# Patient Record
Sex: MALE | Race: WHITE | Employment: FULL TIME | URBAN - METROPOLITAN AREA
[De-identification: names, ages, dates, MRNs, and addresses within clinical notes are randomized per-mention and may not be internally consistent; named-entity substitution may affect disease eponyms.]

---

## 2023-07-23 ENCOUNTER — HOSPITAL ENCOUNTER (OUTPATIENT)
Facility: HOSPITAL | Age: 63
Setting detail: OBSERVATION
LOS: 1 days | Discharge: HOME | End: 2023-07-28
Attending: PSYCHIATRY & NEUROLOGY | Admitting: PSYCHIATRY & NEUROLOGY
Payer: OTHER GOVERNMENT

## 2023-07-23 PROCEDURE — G0378 HOSPITAL OBSERVATION PER HR: HCPCS

## 2023-07-23 PROCEDURE — 6370000000 HC RX 637 (ALT 250 FOR IP): Performed by: PSYCHIATRY & NEUROLOGY

## 2023-07-23 RX ORDER — ACETAMINOPHEN 325 MG/1
650 TABLET ORAL
Status: COMPLETED | OUTPATIENT
Start: 2023-07-23 | End: 2023-07-25

## 2023-07-23 RX ORDER — TRAZODONE HYDROCHLORIDE 50 MG/1
50 TABLET ORAL NIGHTLY
Status: DISCONTINUED | OUTPATIENT
Start: 2023-07-23 | End: 2023-07-23

## 2023-07-23 RX ORDER — MAGNESIUM HYDROXIDE/ALUMINUM HYDROXICE/SIMETHICONE 120; 1200; 1200 MG/30ML; MG/30ML; MG/30ML
30 SUSPENSION ORAL EVERY 6 HOURS PRN
Status: DISCONTINUED | OUTPATIENT
Start: 2023-07-23 | End: 2023-07-28 | Stop reason: HOSPADM

## 2023-07-23 RX ORDER — ALOGLIPTIN 12.5 MG/1
12.5 TABLET, FILM COATED ORAL DAILY
Status: DISCONTINUED | OUTPATIENT
Start: 2023-07-24 | End: 2023-07-28 | Stop reason: HOSPADM

## 2023-07-23 RX ORDER — TRAZODONE HYDROCHLORIDE 50 MG/1
100 TABLET ORAL NIGHTLY
Status: DISCONTINUED | OUTPATIENT
Start: 2023-07-24 | End: 2023-07-25

## 2023-07-23 RX ORDER — CHOLECALCIFEROL (VITAMIN D3) 125 MCG
500 CAPSULE ORAL DAILY
Status: DISCONTINUED | OUTPATIENT
Start: 2023-07-24 | End: 2023-07-28 | Stop reason: HOSPADM

## 2023-07-23 RX ORDER — DIVALPROEX SODIUM 500 MG/1
500 TABLET, DELAYED RELEASE ORAL 3 TIMES DAILY
Status: DISCONTINUED | OUTPATIENT
Start: 2023-07-23 | End: 2023-07-27

## 2023-07-23 RX ORDER — ATORVASTATIN CALCIUM 40 MG/1
40 TABLET, FILM COATED ORAL NIGHTLY
Status: DISCONTINUED | OUTPATIENT
Start: 2023-07-23 | End: 2023-07-28 | Stop reason: HOSPADM

## 2023-07-23 RX ORDER — GAUZE BANDAGE 2" X 2"
100 BANDAGE TOPICAL DAILY
Status: DISCONTINUED | OUTPATIENT
Start: 2023-07-24 | End: 2023-07-28 | Stop reason: HOSPADM

## 2023-07-23 RX ORDER — CHLORDIAZEPOXIDE HYDROCHLORIDE 25 MG/1
25 CAPSULE, GELATIN COATED ORAL 3 TIMES DAILY
Status: DISCONTINUED | OUTPATIENT
Start: 2023-07-23 | End: 2023-07-27

## 2023-07-23 RX ORDER — HYDROCHLOROTHIAZIDE 25 MG/1
12.5 TABLET ORAL DAILY
Status: DISCONTINUED | OUTPATIENT
Start: 2023-07-24 | End: 2023-07-28 | Stop reason: HOSPADM

## 2023-07-23 RX ORDER — AMLODIPINE BESYLATE 5 MG/1
10 TABLET ORAL DAILY
Status: DISCONTINUED | OUTPATIENT
Start: 2023-07-24 | End: 2023-07-28 | Stop reason: HOSPADM

## 2023-07-23 RX ORDER — ZIPRASIDONE HYDROCHLORIDE 40 MG/1
40 CAPSULE ORAL DAILY
Status: DISCONTINUED | OUTPATIENT
Start: 2023-07-24 | End: 2023-07-26

## 2023-07-23 RX ORDER — HYDROXYZINE PAMOATE 25 MG/1
25 CAPSULE ORAL 3 TIMES DAILY PRN
Status: DISCONTINUED | OUTPATIENT
Start: 2023-07-23 | End: 2023-07-28 | Stop reason: HOSPADM

## 2023-07-23 ASSESSMENT — SLEEP AND FATIGUE QUESTIONNAIRES
DO YOU HAVE DIFFICULTY SLEEPING: NO
DO YOU USE A SLEEP AID: NO
AVERAGE NUMBER OF SLEEP HOURS: 6

## 2023-07-23 ASSESSMENT — LIFESTYLE VARIABLES
HOW OFTEN DO YOU HAVE A DRINK CONTAINING ALCOHOL: NEVER
HOW MANY STANDARD DRINKS CONTAINING ALCOHOL DO YOU HAVE ON A TYPICAL DAY: PATIENT DOES NOT DRINK

## 2023-07-23 NOTE — GROUP NOTE
Group Therapy Note    Date: 7/23/2023    Group Start Time: 2493  Group End Time: 2919  Group Topic: Recreational    SSR 2 [address] [facility]        Group Therapy Note    Attendees: 10/11    Recreational Therapist facilitated structured leisure skills group to introduce healthy leisure skills as positive way to cope and manage mood. Patient's Goal:  Attend groups daily    Notes: Attended group and listened to songs with peers. Was receptive to intervention and responded to prompts from staff. Attended entire session and was attentive during group. Status After Intervention:  Improved    Participation Level: Active Listener    Participation Quality: Appropriate      Speech:  normal      Thought Process/Content: Logical      Affective Functioning: Congruent      Mood:  calm       Level of consciousness:  Alert      Response to Learning: Progressing to goal      Endings: None Reported    Modes of Intervention: Activity      Discipline Responsible: Recreational Therapist      Signature:   Kendy Montoya
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
## 2023-07-24 LAB
GLUCOSE BLD STRIP.AUTO-MCNC: 121 MG/DL (ref 65–100)
GLUCOSE BLD STRIP.AUTO-MCNC: 157 MG/DL (ref 65–100)
PERFORMED BY:: ABNORMAL
PERFORMED BY:: ABNORMAL
VALPROATE SERPL-MCNC: 48 UG/ML (ref 50–100)
VALPROATE SERPL-MCNC: 52 UG/ML (ref 50–100)

## 2023-07-24 PROCEDURE — 36415 COLL VENOUS BLD VENIPUNCTURE: CPT

## 2023-07-24 PROCEDURE — 6370000000 HC RX 637 (ALT 250 FOR IP): Performed by: PSYCHIATRY & NEUROLOGY

## 2023-07-24 PROCEDURE — 80164 ASSAY DIPROPYLACETIC ACD TOT: CPT

## 2023-07-24 PROCEDURE — 82962 GLUCOSE BLOOD TEST: CPT

## 2023-07-24 PROCEDURE — G0378 HOSPITAL OBSERVATION PER HR: HCPCS

## 2023-07-24 RX ORDER — ALBUTEROL SULFATE 90 UG/1
2 AEROSOL, METERED RESPIRATORY (INHALATION) EVERY 6 HOURS PRN
Status: DISCONTINUED | OUTPATIENT
Start: 2023-07-24 | End: 2023-07-28 | Stop reason: HOSPADM

## 2023-07-24 RX ORDER — DEXTROSE MONOHYDRATE 100 MG/ML
INJECTION, SOLUTION INTRAVENOUS CONTINUOUS PRN
Status: DISCONTINUED | OUTPATIENT
Start: 2023-07-24 | End: 2023-07-28 | Stop reason: HOSPADM

## 2023-07-24 RX ORDER — INSULIN LISPRO 100 [IU]/ML
0-4 INJECTION, SOLUTION INTRAVENOUS; SUBCUTANEOUS NIGHTLY
Status: DISCONTINUED | OUTPATIENT
Start: 2023-07-24 | End: 2023-07-28 | Stop reason: HOSPADM

## 2023-07-24 RX ORDER — INSULIN LISPRO 100 [IU]/ML
0-4 INJECTION, SOLUTION INTRAVENOUS; SUBCUTANEOUS
Status: DISCONTINUED | OUTPATIENT
Start: 2023-07-24 | End: 2023-07-28 | Stop reason: HOSPADM

## 2023-07-24 RX ADMIN — AMLODIPINE BESYLATE 10 MG: 5 TABLET ORAL at 08:52

## 2023-07-24 RX ADMIN — DIVALPROEX SODIUM 500 MG: 500 TABLET, DELAYED RELEASE ORAL at 17:30

## 2023-07-24 RX ADMIN — HYDROCHLOROTHIAZIDE 12.5 MG: 25 TABLET ORAL at 08:53

## 2023-07-24 RX ADMIN — ATORVASTATIN CALCIUM 40 MG: 40 TABLET, FILM COATED ORAL at 21:13

## 2023-07-24 RX ADMIN — ALOGLIPTIN 12.5 MG: 12.5 TABLET, FILM COATED ORAL at 08:52

## 2023-07-24 RX ADMIN — DIVALPROEX SODIUM 500 MG: 500 TABLET, DELAYED RELEASE ORAL at 21:14

## 2023-07-24 RX ADMIN — DIVALPROEX SODIUM 500 MG: 500 TABLET, DELAYED RELEASE ORAL at 08:52

## 2023-07-24 RX ADMIN — TRAZODONE HYDROCHLORIDE 100 MG: 50 TABLET ORAL at 21:14

## 2023-07-24 RX ADMIN — CHLORDIAZEPOXIDE HYDROCHLORIDE 25 MG: 25 CAPSULE ORAL at 21:13

## 2023-07-24 RX ADMIN — CYANOCOBALAMIN TAB 500 MCG 500 MCG: 500 TAB at 08:53

## 2023-07-24 RX ADMIN — Medication 100 MG: at 08:53

## 2023-07-24 NOTE — BH NOTE
Affect full. Much grandiose talk. Kenzie Gonzalez \"I am a very smart, intelligent person. \" Pt said he is a , and did not return to the [place], \"because I don't like the way, I was treated. \" Verbalized many complaints about this hospital; as well;  said he would change \"a lot of things. \"  Consumed approx 1/2 of his bfkt; complained about what he was served and gave much of it away, to peers. Pt is here, under a TDO, and is scheduled to have his commitment hearing today. Refused 0900 scheduled Geodon and Librium; stated, \"I don't take those meds. \" Compliant with remaining meds. Showered anc changed his clothes. Q 15 mins checks maintained, for safety. Committed x 10 days. Remained calm, after returning from hearing. Pt requested to speak with KRYSTAL Victor RN/CC, this morning; however, when he came to talk to him, pt stated, \"I just want you to know I am Ievlisse's protector. \" XIMENA is another pt, on the unit.

## 2023-07-24 NOTE — PLAN OF CARE
Problem: Discharge Planning  Goal: Discharge to home or other facility with appropriate resources  Outcome: Progressing     Problem: Safety - Adult  Goal: Free from fall injury  7/24/2023 0249 by Carolina Pereira RN  Outcome: Progressing  7/23/2023 1809 by Dev De Jesus RN  Outcome: Progressing     Problem: Confusion  Goal: Confusion, delirium, dementia, or psychosis is improved or at baseline  Description: INTERVENTIONS:  1. Assess for possible contributors to thought disturbance, including medications, impaired vision or hearing, underlying metabolic abnormalities, dehydration, psychiatric diagnoses, and notify attending LIP  2. [place] high risk fall precautions, as indicated  3. Provide frequent short contacts to provide reality reorientation, refocusing and direction  4. Decrease environmental stimuli, including noise as appropriate  5. Monitor and intervene to maintain adequate nutrition, hydration, elimination, sleep and activity  6. If unable to ensure safety without constant attention obtain sitter and review sitter guidelines with assigned personnel  7.  Initiate Psychosocial CNS and Spiritual Care consult, as indicated  7/24/2023 0249 by Carolina Pereira RN  Outcome: Progressing  7/23/2023 1812 by Dev De Jesus RN  Outcome: Progressing

## 2023-07-24 NOTE — CARE COORDINATION
07/24/23 1322   ITP   Date of Plan 07/24/23   Date of Next Review 08/04/23   Barriers to Treatment Client resistance   Strengths Incorporated in Plan Acknowledging need for assistance   Plan of Care   Long Term Goal (LTG) Stated in patient/guardian terms \"to get released from the hospital\"   Short Term Goal 1   Short Term Goal 1 Client will be oriented to program and staff, and participate in assessment process   Baseline Functioning to make patient comfortable with environment while in the hospital   Target the patient will be able to approach staff and voice appropriate needs   Intervention 1 Acknowledge client strengths   Frequency daily   Measured by Self report;Staff observation   Staff Responsible Clinical staff;[facility] staff   Intervention 2 Assess safety   Frequency daily   Measured by Self report;Staff observation   Staff Responsible Clinical staff;[facility] staff   Intervention 3 Group therapy   Frequency daily   Measured by Self report;Staff observation   Staff Responsible Clinical staff;[facility] staff   STG Goal 1 Status: Patient Appears to be  Progressing toward treatment plan goal   Short Term Goal 2   Short Term Goal 2 Client will learn and demonstrate effective coping skills   Baseline Functioning to improve overall quality of life   Target patient will be able to use positive skills to deal with life stressors   Objectives Client will participate in individual therapy;Client will participate in group therapy   Intervention 1 Milieu therapy and support   Frequency daily   Measured by Self report;Staff observation   Staff Responsible Clinical staff;[facility] staff   Intervention 2 Monitor medications   Frequency daily   Measured by Self report;Staff observation   Staff Responsible Clinical staff;[facility] staff   Intervention 3 Group therapy   Frequency daily   Measured by Self report;Staff observation   Staff Responsible Clinical staff;[facility] staff   STG Goal 2 Status: Patient Appears to be  Progressing toward treatment plan

## 2023-07-24 NOTE — BH NOTE
80 Female peer was redirected, for standing very close, to pt, touching his face and chest. Pt did not move. Both patients were informed, physical contact, is not allowed. Pt took on the protective role, of female peer. when staff went in to give female peer, her meds, for agitation, pt asked female peer, Suraj Blinks can I do for you, so that you don't have to take medicines. \" Pt was asked to go to his room, by KRYSTAL Jewell RN, and security. Pt cooperative with going to his room.

## 2023-07-24 NOTE — GROUP NOTE
Group Therapy Note    Date: 7/24/2023    Group Start Time: 1525  Group End Time: 5371  Group Topic: Recreational    SSR 2 BH NON ACUTE    Beatriz Neil        Group Therapy Note    Facilitated leisure skills group to reinforce positive coping and to manage mood through music, social interaction, group activities and art task     Attendees: 7/11       Patient's Goal:  Client will learn and demonstrate effective coping skills    Notes:  Pt was receptive to listening to music and songs he selected. Interacted with peer and staff when prompted. Declined to work on leisure task    Status After Intervention: Improved    Participation Level:  Active Listener and Interactive    Participation Quality: Appropriate      Speech:  normal      Thought Process/Content: Logical      Affective Functioning: Flat      Mood:  Calm      Level of consciousness:  Alert      Response to Learning: Progressing to goal      Endings: None Reported    Modes of Intervention: Socialization and Activity      Discipline Responsible: Recreational Therapist      Signature:  Barb Alvarado

## 2023-07-24 NOTE — BH NOTE
HEARING DISPOSITION     : Jarrod Stephen  : Ms. Debo Susana: 10 Days   PRASAD: Any anti-psychotic and/or anti-anxiety   Expires: August 2,2023
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
## 2023-07-24 NOTE — BH NOTE
62yo TDO admitted 7/23/23 d/t psychosis & HI. Hx Bipolar, PTSD, cocaine, MJ, ETOH, HTN, DM, COPD. Patient presents poor historian, poor judgment, poor insight. Patient denies SI/HI, depression/anxiety, hallucinations/delusions, pain/withdrawal symptoms. CIWA = 2. Tremors noted, patient states it's from medication specifically geodon which was discontinued last week however according to chart prozac discontinued & latuda started last week, medication compliance questionable. Patient unsure/unclear of active medications & dosages, medication history obtained from prescreen & other hospital records including norvasc, alogliptin, lipitor, depakote, hydrochlorothiazide, geodon. Patient also requesting fish oil, vitD3, B12, ativan. Patient c/o hypomania, bible provided per request & patient in room to decrease stimuli d/t \"nuttiness\" on the unit. Continue close observation checks, detox protocol including CIWA assessments, seizure precautions, fall precautions. H&Ps pending, VPA level pending. Addendum:     7222 Patient awake, walking the halls, stating he'll be up all night d/t sleeping all day. Still refusing any medications at this time, stating he's okay. 1027 Patient awake intermittently throughout the night, reading bible, walking around, exercising, looking out of hallway window.

## 2023-07-24 NOTE — BH NOTE
Refused 0900 scheduled Geodon & Librium; stated, \"I don't take those meds. I wasn't given those @ the [facility]. \"

## 2023-07-25 LAB
GLUCOSE BLD STRIP.AUTO-MCNC: 109 MG/DL (ref 65–100)
GLUCOSE BLD STRIP.AUTO-MCNC: 144 MG/DL (ref 65–100)
GLUCOSE BLD STRIP.AUTO-MCNC: 188 MG/DL (ref 65–100)
GLUCOSE BLD STRIP.AUTO-MCNC: 197 MG/DL (ref 65–100)
PERFORMED BY:: ABNORMAL

## 2023-07-25 PROCEDURE — G0378 HOSPITAL OBSERVATION PER HR: HCPCS

## 2023-07-25 PROCEDURE — 82962 GLUCOSE BLOOD TEST: CPT

## 2023-07-25 PROCEDURE — 6370000000 HC RX 637 (ALT 250 FOR IP): Performed by: PSYCHIATRY & NEUROLOGY

## 2023-07-25 RX ORDER — TRAZODONE HYDROCHLORIDE 50 MG/1
50 TABLET ORAL NIGHTLY
Status: DISCONTINUED | OUTPATIENT
Start: 2023-07-25 | End: 2023-07-28 | Stop reason: HOSPADM

## 2023-07-25 RX ADMIN — DIVALPROEX SODIUM 500 MG: 500 TABLET, DELAYED RELEASE ORAL at 21:17

## 2023-07-25 RX ADMIN — ZIPRASIDONE HYDROCHLORIDE 40 MG: 40 CAPSULE ORAL at 08:35

## 2023-07-25 RX ADMIN — CHLORDIAZEPOXIDE HYDROCHLORIDE 25 MG: 25 CAPSULE ORAL at 08:36

## 2023-07-25 RX ADMIN — Medication 100 MG: at 08:37

## 2023-07-25 RX ADMIN — CHLORDIAZEPOXIDE HYDROCHLORIDE 25 MG: 25 CAPSULE ORAL at 21:20

## 2023-07-25 RX ADMIN — CHLORDIAZEPOXIDE HYDROCHLORIDE 25 MG: 25 CAPSULE ORAL at 14:22

## 2023-07-25 RX ADMIN — TRAZODONE HYDROCHLORIDE 50 MG: 50 TABLET ORAL at 21:18

## 2023-07-25 RX ADMIN — DIVALPROEX SODIUM 500 MG: 500 TABLET, DELAYED RELEASE ORAL at 08:37

## 2023-07-25 RX ADMIN — AMLODIPINE BESYLATE 10 MG: 5 TABLET ORAL at 08:35

## 2023-07-25 RX ADMIN — ACETAMINOPHEN 650 MG: 325 TABLET ORAL at 14:19

## 2023-07-25 RX ADMIN — CYANOCOBALAMIN TAB 500 MCG 500 MCG: 500 TAB at 08:35

## 2023-07-25 RX ADMIN — ATORVASTATIN CALCIUM 40 MG: 40 TABLET, FILM COATED ORAL at 21:17

## 2023-07-25 RX ADMIN — ALOGLIPTIN 12.5 MG: 12.5 TABLET, FILM COATED ORAL at 08:36

## 2023-07-25 RX ADMIN — DIVALPROEX SODIUM 500 MG: 500 TABLET, DELAYED RELEASE ORAL at 14:00

## 2023-07-25 RX ADMIN — HYDROCHLOROTHIAZIDE 12.5 MG: 25 TABLET ORAL at 08:36

## 2023-07-25 ASSESSMENT — PAIN SCALES - GENERAL: PAINLEVEL_OUTOF10: 4

## 2023-07-25 ASSESSMENT — PAIN DESCRIPTION - LOCATION: LOCATION: FOOT

## 2023-07-25 ASSESSMENT — PAIN DESCRIPTION - DESCRIPTORS: DESCRIPTORS: ACHING;BURNING

## 2023-07-25 ASSESSMENT — PAIN DESCRIPTION - ORIENTATION: ORIENTATION: RIGHT;LEFT

## 2023-07-25 ASSESSMENT — PAIN - FUNCTIONAL ASSESSMENT: PAIN_FUNCTIONAL_ASSESSMENT: ACTIVITIES ARE NOT PREVENTED

## 2023-07-25 NOTE — BH NOTE
Client is pleasant and cooperative. Alert and oriented x 3. He has a good appetite and reports sleeping well. Denies any thoughts to harm himself or others. Client called his wife and gave her his personal id number. He has been attending scheduled groups and unit activities. He is close with select female peer. No inappropiate actions on their part. Denies any withdrawal symptoms. Remains on close observation for safety.

## 2023-07-25 NOTE — PLAN OF CARE
Problem: Discharge Planning  Goal: Discharge to home or other facility with appropriate resources  Outcome: Progressing     Problem: Safety - Adult  Goal: Free from fall injury  Outcome: Progressing     Problem: Confusion  Goal: Confusion, delirium, dementia, or psychosis is improved or at baseline  Description: INTERVENTIONS:  1. Assess for possible contributors to thought disturbance, including medications, impaired vision or hearing, underlying metabolic abnormalities, dehydration, psychiatric diagnoses, and notify attending LIP  2. [place] high risk fall precautions, as indicated  3. Provide frequent short contacts to provide reality reorientation, refocusing and direction  4. Decrease environmental stimuli, including noise as appropriate  5. Monitor and intervene to maintain adequate nutrition, hydration, elimination, sleep and activity  6. If unable to ensure safety without constant attention obtain sitter and review sitter guidelines with assigned personnel  7.  Initiate Psychosocial CNS and Spiritual Care consult, as indicated  Outcome: Progressing

## 2023-07-25 NOTE — GROUP NOTE
Group Therapy Note    Date: 7/25/2023    Group Start Time: 6875  Group End Time: 1600  Group Topic: Education Group - Inpatient    SSR 2 BH NON ACUTE    Christa Hoskins        Group Therapy Note    Facilitated group focused on introducing information on learning to complete a thought log to help challenge negative thoughts and develop a more accurate view of a situation to improve mood and behavior     Attendees: 6/11       Notes: Encouraged but did not attend    Discipline Responsible: Recreational Therapist      Signature:  SOLO Regan
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
## 2023-07-25 NOTE — PROGRESS NOTES
Patient, Mr. Marli López, had asked for spiritual  and encouragement. He asked for and choose to be a part of a spirituality group meeting. He willingly participated and engaged in some conversation. He expressed having little patience with others, especially rude people or people of difference of opinion, which became evident during our time together. He desires more, and admits to the wisdom of allowing the helping hands of others and accountability. He expressed much appreciation and gratitude for the care, support and the talk. Chaplain Britt Mcpherson M.Div., [facility]. Please contact Hospital  for [address] services.    [phone]
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
## 2023-07-25 NOTE — BH NOTE
. /81. Patient denies SI/HI; denies withdrawal symptoms; CIWA = 2, tremors noted; presents social, grandiose, labile. Patient noted in dayroom interacting with peers; compliant with bedtime snack & medications including librium after initially refusing. Hearing today resulted in commitment with order to treat until august 2 2023. Continue safety precautions including q15m checks, detox protocol, seizure precautions, fall precautions; continue medication management, group therapy.

## 2023-07-25 NOTE — GROUP NOTE
Group Therapy Note    Date: 7/25/2023    Group Start Time: 1600  Group End Time: 1640  Group Topic: Recreational    SSR 2 BH NON ACUTE    Michael Buchanan        Group Therapy Note    Facilitated leisure skills group to reinforce positive coping and to manage mood through music, social interaction, group activities and art task    Attendees: 7/11       Patient's Goal:  Client will learn and demonstrate effective coping skills     Notes:  Pt was receptive to listening to music for a few minutes then he fell asleep. Before leaving group pt stated \"he was sorry for falling asleep\"    Status After Intervention:  Improved    Participation Level:  Active Listener    Participation Quality: Appropriate      Speech:  normal      Thought Process/Content: Logical      Affective Functioning: Congruent      Mood:  Calm      Level of consciousness:  Sleepy      Response to Learning: Progressing to goal      Endings: None Reported    Modes of Intervention: Socialization and Activity      Discipline Responsible: Recreational Therapist      Signature:  Barb Gilbert

## 2023-07-26 LAB
GLUCOSE BLD STRIP.AUTO-MCNC: 111 MG/DL (ref 65–100)
GLUCOSE BLD STRIP.AUTO-MCNC: 131 MG/DL (ref 65–100)
GLUCOSE BLD STRIP.AUTO-MCNC: 157 MG/DL (ref 65–100)
PERFORMED BY:: ABNORMAL

## 2023-07-26 PROCEDURE — 82962 GLUCOSE BLOOD TEST: CPT

## 2023-07-26 PROCEDURE — 6370000000 HC RX 637 (ALT 250 FOR IP): Performed by: PSYCHIATRY & NEUROLOGY

## 2023-07-26 PROCEDURE — G0378 HOSPITAL OBSERVATION PER HR: HCPCS

## 2023-07-26 RX ORDER — ACETAMINOPHEN 325 MG/1
650 TABLET ORAL EVERY 4 HOURS PRN
Status: DISCONTINUED | OUTPATIENT
Start: 2023-07-26 | End: 2023-07-28 | Stop reason: HOSPADM

## 2023-07-26 RX ORDER — LURASIDONE HYDROCHLORIDE 40 MG/1
40 TABLET, FILM COATED ORAL
Status: DISCONTINUED | OUTPATIENT
Start: 2023-07-26 | End: 2023-07-27

## 2023-07-26 RX ADMIN — DIVALPROEX SODIUM 500 MG: 500 TABLET, DELAYED RELEASE ORAL at 14:41

## 2023-07-26 RX ADMIN — DIVALPROEX SODIUM 500 MG: 500 TABLET, DELAYED RELEASE ORAL at 21:06

## 2023-07-26 RX ADMIN — CHLORDIAZEPOXIDE HYDROCHLORIDE 25 MG: 25 CAPSULE ORAL at 21:06

## 2023-07-26 RX ADMIN — DIVALPROEX SODIUM 500 MG: 500 TABLET, DELAYED RELEASE ORAL at 08:22

## 2023-07-26 RX ADMIN — TRAZODONE HYDROCHLORIDE 50 MG: 50 TABLET ORAL at 21:06

## 2023-07-26 RX ADMIN — ATORVASTATIN CALCIUM 40 MG: 40 TABLET, FILM COATED ORAL at 21:06

## 2023-07-26 RX ADMIN — LURASIDONE HYDROCHLORIDE 40 MG: 40 TABLET, FILM COATED ORAL at 14:41

## 2023-07-26 RX ADMIN — AMLODIPINE BESYLATE 10 MG: 5 TABLET ORAL at 08:23

## 2023-07-26 RX ADMIN — CYANOCOBALAMIN TAB 500 MCG 500 MCG: 500 TAB at 08:23

## 2023-07-26 RX ADMIN — CHLORDIAZEPOXIDE HYDROCHLORIDE 25 MG: 25 CAPSULE ORAL at 14:41

## 2023-07-26 RX ADMIN — HYDROCHLOROTHIAZIDE 12.5 MG: 25 TABLET ORAL at 08:22

## 2023-07-26 RX ADMIN — Medication 100 MG: at 08:22

## 2023-07-26 RX ADMIN — CHLORDIAZEPOXIDE HYDROCHLORIDE 25 MG: 25 CAPSULE ORAL at 08:22

## 2023-07-26 RX ADMIN — ALOGLIPTIN 12.5 MG: 12.5 TABLET, FILM COATED ORAL at 08:22

## 2023-07-26 RX ADMIN — ACETAMINOPHEN 650 MG: 325 TABLET ORAL at 03:25

## 2023-07-26 ASSESSMENT — PAIN DESCRIPTION - ORIENTATION: ORIENTATION: LEFT

## 2023-07-26 ASSESSMENT — PAIN DESCRIPTION - LOCATION: LOCATION: FINGER (COMMENT WHICH ONE)

## 2023-07-26 ASSESSMENT — PAIN DESCRIPTION - DESCRIPTORS: DESCRIPTORS: ACHING

## 2023-07-26 ASSESSMENT — PAIN - FUNCTIONAL ASSESSMENT: PAIN_FUNCTIONAL_ASSESSMENT: ACTIVITIES ARE NOT PREVENTED

## 2023-07-26 ASSESSMENT — PAIN SCALES - GENERAL: PAINLEVEL_OUTOF10: 8
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
## 2023-07-26 NOTE — BH NOTE
This writer received vm from ms rosalia/wife stating that the pt called her stating that he is feeling unsafe and threatened by peers. This writer called wife and left vm that pt and his peers are being closely being monitored by staff and will cont to be monitored to maintain a safe environment. [facility] staff, ms escamilla and david aware of this conversation.
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
## 2023-07-26 NOTE — PLAN OF CARE
Problem: Safety - Adult  Goal: Free from fall injury  Outcome: Progressing     Problem: Confusion  Goal: Confusion, delirium, dementia, or psychosis is improved or at baseline  Description: INTERVENTIONS:  1. Assess for possible contributors to thought disturbance, including medications, impaired vision or hearing, underlying metabolic abnormalities, dehydration, psychiatric diagnoses, and notify attending LIP  2. [place] high risk fall precautions, as indicated  3. Provide frequent short contacts to provide reality reorientation, refocusing and direction  4. Decrease environmental stimuli, including noise as appropriate  5. Monitor and intervene to maintain adequate nutrition, hydration, elimination, sleep and activity  6. If unable to ensure safety without constant attention obtain sitter and review sitter guidelines with assigned personnel  7.  Initiate Psychosocial CNS and Spiritual Care consult, as indicated  Outcome: Progressing

## 2023-07-26 NOTE — BH NOTE
Behavioral Health Treatment Team Note     Patient goal(s) for today: \"to live [place] like as much as I can\"  Treatment team focus/goals: meds management, group therapy, dc planning    Progress note: pt was seen earlier in the morning while walking the hallway. Pt presented as alert, oriented, calm, cooperative. Pt denied current si/hi/ah/vh but remains religiously preoccupied. Pt stated \"I did not have a great night last night, there is just too much noise around here with all the codes being called during the night and also the people here are using foul language, yelling, lying and not being [place] like and that just makes me angry and pisses me off\" after making the above statement pt walked away. During the treatment team pt presented to be calm and polite initially, reporting to the attending provider that he was \"feeling fantastic\" and again focusing on how the other patients are \"not [place] like\" and that this was upsetting him. He also stated that his sister who is an  in [address] [place] [address] Street will be looking into his stay in the hospital. The longer the conversation went on pt became rude, sarcastic and short with the attending provider and demanded that he be give a private room \"by the end of the day\", the attending provider calmly stated that she would need to look into the matter, to which the pt replied \"f  off\" pt was seen pacing the hallway, cursing after this interaction. [facility] staff made aware of change in pts clinical presentation.     LOS:  1  Expected LOS: 5-7    Insurance info/prescription coverage:  VAC[employer] OPTUM  Date of last family contact:  Evi/wife [phone] on 7/26  Family requesting physician contact today:  Yes  Discharge plan:  to stabilize pt  Guns in the home:  No   Outpatient provider(s):   [facility] Flavio Wright [phone]    Participating treatment team members: Maxene Lanes, * (assigned SW), Ivanna Durham MS

## 2023-07-26 NOTE — PROGRESS NOTES
Spirituality Group   Patient actively participated in Spirituality Group in -    Group exercise was to discuss and/or identify, if possible, our individual worldview that is based upon our individual views on life's 4 most fundamental questions of: Where did I come from,  What is my worth/value,  What is my purpose,  What happens to me after I die? After discussion, Patient was asked to use the few days here to reflect and meditate on their answers to these questions and determine if all four answers are correspondingly true. Do my answers put together have a coherency and unity/harmony to them? And does my answers provide me with meaning, a morality, and a positive herson that leaves me with an inner peace. The purpose of this exercise is to find their beliefs and true identity, and thereby, connecting/creating an inner peace to help them navigate their lives through difficult times in healthy ways. Chaplain Nicholas Victoria M.Div., [facility]. Please contact Hospital  for [address] services.    [phone]

## 2023-07-26 NOTE — BH NOTE
Pt instigating others in dayroom and making racial comments to others. Pt told by staff that those comments are inappropriate and will need to leave the dayroom and go to his room if the comments do not stop and pt told writer \"make me\".

## 2023-07-27 LAB
GLUCOSE BLD STRIP.AUTO-MCNC: 133 MG/DL (ref 65–100)
GLUCOSE BLD STRIP.AUTO-MCNC: 184 MG/DL (ref 65–100)
GLUCOSE BLD STRIP.AUTO-MCNC: 90 MG/DL (ref 65–100)
PERFORMED BY:: ABNORMAL
PERFORMED BY:: ABNORMAL
PERFORMED BY:: NORMAL

## 2023-07-27 PROCEDURE — 6370000000 HC RX 637 (ALT 250 FOR IP): Performed by: PSYCHIATRY & NEUROLOGY

## 2023-07-27 PROCEDURE — 82962 GLUCOSE BLOOD TEST: CPT

## 2023-07-27 PROCEDURE — G0378 HOSPITAL OBSERVATION PER HR: HCPCS

## 2023-07-27 RX ORDER — DIVALPROEX SODIUM 500 MG/1
1000 TABLET, DELAYED RELEASE ORAL 2 TIMES DAILY
Status: DISCONTINUED | OUTPATIENT
Start: 2023-07-27 | End: 2023-07-28 | Stop reason: HOSPADM

## 2023-07-27 RX ORDER — LORAZEPAM 1 MG/1
2 TABLET ORAL EVERY 6 HOURS PRN
Status: DISCONTINUED | OUTPATIENT
Start: 2023-07-27 | End: 2023-07-28 | Stop reason: HOSPADM

## 2023-07-27 RX ORDER — LORAZEPAM 2 MG/ML
2 INJECTION INTRAMUSCULAR EVERY 4 HOURS PRN
Status: DISCONTINUED | OUTPATIENT
Start: 2023-07-27 | End: 2023-07-28 | Stop reason: HOSPADM

## 2023-07-27 RX ORDER — LORAZEPAM 1 MG/1
2 TABLET ORAL EVERY 4 HOURS PRN
Status: DISCONTINUED | OUTPATIENT
Start: 2023-07-27 | End: 2023-07-27

## 2023-07-27 RX ORDER — CHLORDIAZEPOXIDE HYDROCHLORIDE 5 MG/1
10 CAPSULE, GELATIN COATED ORAL 4 TIMES DAILY
Status: DISCONTINUED | OUTPATIENT
Start: 2023-07-27 | End: 2023-07-28 | Stop reason: HOSPADM

## 2023-07-27 RX ORDER — CHLORDIAZEPOXIDE HYDROCHLORIDE 5 MG/1
10 CAPSULE, GELATIN COATED ORAL 4 TIMES DAILY
Status: DISCONTINUED | OUTPATIENT
Start: 2023-07-28 | End: 2023-07-27

## 2023-07-27 RX ORDER — LORAZEPAM 0.5 MG/1
0.5 TABLET ORAL EVERY 4 HOURS PRN
Status: DISCONTINUED | OUTPATIENT
Start: 2023-07-27 | End: 2023-07-27

## 2023-07-27 RX ORDER — LURASIDONE HYDROCHLORIDE 80 MG/1
80 TABLET, FILM COATED ORAL
Status: DISCONTINUED | OUTPATIENT
Start: 2023-07-28 | End: 2023-07-28 | Stop reason: HOSPADM

## 2023-07-27 RX ORDER — ZIPRASIDONE MESYLATE 20 MG/ML
20 INJECTION, POWDER, LYOPHILIZED, FOR SOLUTION INTRAMUSCULAR ONCE
Status: DISCONTINUED | OUTPATIENT
Start: 2023-07-27 | End: 2023-07-28 | Stop reason: HOSPADM

## 2023-07-27 RX ADMIN — LORAZEPAM 2 MG: 1 TABLET ORAL at 11:23

## 2023-07-27 RX ADMIN — Medication 100 MG: at 08:29

## 2023-07-27 RX ADMIN — ACETAMINOPHEN 650 MG: 325 TABLET ORAL at 01:26

## 2023-07-27 RX ADMIN — ATORVASTATIN CALCIUM 40 MG: 40 TABLET, FILM COATED ORAL at 21:32

## 2023-07-27 RX ADMIN — ALOGLIPTIN 12.5 MG: 12.5 TABLET, FILM COATED ORAL at 08:30

## 2023-07-27 RX ADMIN — DIVALPROEX SODIUM 500 MG: 500 TABLET, DELAYED RELEASE ORAL at 08:30

## 2023-07-27 RX ADMIN — CHLORDIAZEPOXIDE HYDROCHLORIDE 10 MG: 5 CAPSULE ORAL at 21:48

## 2023-07-27 RX ADMIN — DIVALPROEX SODIUM 1000 MG: 500 TABLET, DELAYED RELEASE ORAL at 21:32

## 2023-07-27 RX ADMIN — TRAZODONE HYDROCHLORIDE 50 MG: 50 TABLET ORAL at 21:32

## 2023-07-27 RX ADMIN — CHLORDIAZEPOXIDE HYDROCHLORIDE 25 MG: 25 CAPSULE ORAL at 09:05

## 2023-07-27 RX ADMIN — CYANOCOBALAMIN TAB 500 MCG 500 MCG: 500 TAB at 08:30

## 2023-07-27 RX ADMIN — CHLORDIAZEPOXIDE HYDROCHLORIDE 25 MG: 25 CAPSULE ORAL at 09:00

## 2023-07-27 RX ADMIN — AMLODIPINE BESYLATE 10 MG: 5 TABLET ORAL at 08:30

## 2023-07-27 RX ADMIN — HYDROCHLOROTHIAZIDE 12.5 MG: 25 TABLET ORAL at 08:29

## 2023-07-27 RX ADMIN — LURASIDONE HYDROCHLORIDE 40 MG: 40 TABLET, FILM COATED ORAL at 08:59

## 2023-07-27 ASSESSMENT — PAIN DESCRIPTION - LOCATION: LOCATION: FINGER (COMMENT WHICH ONE)

## 2023-07-27 ASSESSMENT — PAIN - FUNCTIONAL ASSESSMENT: PAIN_FUNCTIONAL_ASSESSMENT: ACTIVITIES ARE NOT PREVENTED

## 2023-07-27 ASSESSMENT — PAIN SCALES - GENERAL: PAINLEVEL_OUTOF10: 8

## 2023-07-27 ASSESSMENT — PAIN DESCRIPTION - DESCRIPTORS: DESCRIPTORS: ACHING

## 2023-07-27 ASSESSMENT — PAIN DESCRIPTION - ORIENTATION: ORIENTATION: LEFT

## 2023-07-27 NOTE — BH NOTE
Affect full. Pt has been loud, intrusive, demanding, unpredictable, & intimidating, this shift. There is an AA female, on the unit, who pt said he is protecting. All during the week, pt was saying he was protecting a  female peer; however, today, he turned against the  female and was verbally abusive and verbally aggressive towards her. Very intrusive. Pt is having much difficulty getting along with others on the unit. Exhibiting bullying behavior towards peers and staff. Pt is a marine. 1215 Pt pushed the food cart, very hard against the entrance door. \"Get me my damn food. \" Pt has changed his mind, about what type of diet he wants. Pt initially said he is on a low carb diet, now he is saying he is a vegetarian. Refused to complete his menu. Pt was asked what he would eat, and he said a grill cheese. Dietary was called to bring sandwiches. Redirected for pushing on the entrance doors; very hard, because his sandwiches still had not come. Dietary ws called x 3.     1350 Dietary bought the sandwiches and chips, to the unit and pt took them to his room, to consume. 80 Pt slammed the door, to his room, very hard, then he came out with a paper bag, with his personal belongings , and dropped it on the floor, by the seclusion rooms, very hard. 26 Pt  said he feels sorry, for the staff that  work on this floor. Said we could be making more money, @ the [facility]. Pt hen stated, \"yall don't really care about me, do you? \" Before anyone could respond, pt stated, \"f - - k yall. \"  Pt walked away. 1500 Pt's wife bought him some additional clothes, and Dr. Yvonne Zamorano met with her. Pt slept some, then came to the nurse's station and requested the clothes his wife bought him. 1630 Refused to allow BS to be monitored. Refused scheduled Librium. Consumed 3/4 of dinner, in the day room w/others. Calm.

## 2023-07-27 NOTE — BH NOTE
This writer attended a meeting with hospital administration team to discuss concerns over patient's threatening behavior towards staff. Writer met with patients wife and discuss any concerns. Wife did not feel patient is at baseline. Consulted with attending psyc provider, who reviewed meds with wife and plan at this time is for the pt to cont with inpt for meds management for further stabilization. Patient agreed to cooperate with nursing staff and cont stay for meds management.

## 2023-07-27 NOTE — GROUP NOTE
Group Therapy Note    Date: 7/27/2023    Group Start Time: 2145  Group End Time: 9017  Group Topic: Recreational    SSR 2 BH NON ACUTE    Igor Musa        Group Therapy Note    Facilitated leisure skills group to reinforce positive coping and to manage mood through music, social interaction, group activities and art task     Attendees: 5/9       Patient's Goal:  Client will learn and demonstrate effective coping skills    Notes:  Pt was receptive to listening to music and songs he selected. Interacted with peer and staff when prompted. Was falling asleep. Declined to work on leisure task     Status After Intervention:  Improved    Participation Level:  Active Listener    Participation Quality: Appropriate      Speech:  normal      Thought Process/Content: Logical      Affective Functioning: Congruent      Mood:  Calm      Level of consciousness:  Drowsy      Response to Learning: Progressing to goal      Endings: None Reported    Modes of Intervention: Socialization and Activity      Discipline Responsible: Recreational Therapist      Signature:  Barb Murcia

## 2023-07-27 NOTE — PLAN OF CARE
Problem: Safety - Adult  Goal: Free from fall injury  Outcome: Progressing     Problem: Confusion  Goal: Confusion, delirium, dementia, or psychosis is improved or at baseline  Description: INTERVENTIONS:  1. Assess for possible contributors to thought disturbance, including medications, impaired vision or hearing, underlying metabolic abnormalities, dehydration, psychiatric diagnoses, and notify attending LIP  2. [place] high risk fall precautions, as indicated  3. Provide frequent short contacts to provide reality reorientation, refocusing and direction  4. Decrease environmental stimuli, including noise as appropriate  5. Monitor and intervene to maintain adequate nutrition, hydration, elimination, sleep and activity  6. If unable to ensure safety without constant attention obtain sitter and review sitter guidelines with assigned personnel  7.  Initiate Psychosocial CNS and Spiritual Care consult, as indicated  Outcome: Progressing  Flowsheets (Taken 7/26/2023 2222)  Effect of thought disturbance (confusion, delirium, dementia, or psychosis) are managed with adequate functional status: Monitor and intervene to maintain adequate nutrition, hydration, elimination, sleep and activity

## 2023-07-27 NOTE — BH NOTE
Nursing Note    Patient is alert and oriented x 3. He denies any SI/HI/AH/VH. Patient denies any anxiety or depression. Broad affect and calm mood. FSBS 111 mg/dl. Patient seen pacing hallways with eyes closed. He accepted his medications without difficulty. Staff will continue to monitor patient for safety.

## 2023-07-27 NOTE — FORENSIC NURSE
FNE responded to Code ATLAS. Safety concerns and plan discussed. Care conference suggested. RN advised to contact FNE with any further concerns.

## 2023-07-27 NOTE — BH NOTE
1100 Pt came out of his room, with a blanket around his shoulders, and became loud when MHT Domonique Iqbal informed him that it was not allowed; pt stated, \"I do what ever I damn well please. \" Pt ambulated in the hallway, and into the day room with the blanket, around his shoulders. Pt heard female peer, ask staff to get her cell phone, so that she could get her daughter's number. RN [employer] was ambulating in the hallway, in front of the nurse's station, and pt went up to his face, and yelled, \"go get her damn phone now. \" Eda Omayra was called. Dr. Mallory Faust was called for med; Geodon 20 mg/Q12  hrs ordered; however, when writer obtained the med, and took it to his room, where he was sitting talking to the CM, pt stated, \"I'm allergic to [address]. I will only take Ativan. \" Dr. Mallory Faust was called again,  and an order for Ativan 2 mg IM, was given; however, when writer took the med, in the room, to give to the pt, he stated, \"no shots. Don't bring no shot to me. I will only take it by mouth. \" Dr. Mallory Faust was called again and the p o Ativan was also ordered & pt accepted it. Rowan Phelps
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
## 2023-07-27 NOTE — PROGRESS NOTES
on unit to for consultation with another patient when he came upon a Code [place] for Mr. Estevan Langley. Unit staff and security present.  stood by providing a prayerful presence, when Mr Akila Gomez called for [employer]. Staff had  come into his room where  talked with Mr. Akila Gomez. Mr Akila Gomez then agreed to take his medicine and atlas ended. Mr Akila Gomez approached [employer] again on Chaplains way out and vented more angry emotions about his lunch food not being right. That he believes staff is targeting him now to make his life miserable. He vented his emotions, threatening to give up on God, turning back into an atheist. [employer] encouraged and admonished him to not believe things he cannot prove and to remain cooperative and focus on his health so he can be discharged. To not lose sight of the end goal, and not let all the little distractions get under the skin. Life will always be like that, that we all must learn to deal and cope with the aggravating distractions of life. Please contact Spiritual Care for any emotional and spiritual needs and/or referrals. Thank you. Chaplain Nicholas Victoria M.Div., [facility]. Please contact Hospital  for [address] services.    [phone]

## 2023-07-27 NOTE — BH NOTE
0745 Pt approached writer and requested a t shirt, because he was cold. When writer informed him that we no longer had a clothes closet; therefore, did  not have one, pt stated, \"f - - k, [facility]. Pt showed writer a notebook, and stated, \"I'm writing down stuff about this staff. \"     0800 Pt accepted his bfkt tray, and verbalized many complaints. Pt said he is on a low carb diet, and was served too many carbs. Pt was asked if he filled out his menu, yesterday morning, and he said he did; however, his menu was not completed. Pt balled the menu up, when writer requested to see what he had chosen. Consumed 3/4 of meal.     0815 Exhibiting labile behavior this morning. One minute pt is cordial/respectful, and the next x pt is verbally aggressive. Pt was standing @ the med cart, telling writer, a female in the day room, needed to be given a Haldol shot, & put in the quiet room, when another female peer confronted him and accused him of being a snitch. All patients redirected and encouraged to stop bickering with each other. Compliant with 0900 scheduled meds. 6096 Pt approached writer and stated, \"can I have a Librium, or some Ativan, for anxiety\"  Pt initially refused Librium, when given his 0900 meds. Cont to talk about female peers, with writer, in a negative manner.  reported pt cont to be an instigator, in the day room.

## 2023-07-28 ENCOUNTER — HOSPITAL ENCOUNTER (EMERGENCY)
Facility: HOSPITAL | Age: 63
Discharge: LEFT BEFORE DISCHARGE | End: 2023-07-28

## 2023-07-28 VITALS
SYSTOLIC BLOOD PRESSURE: 160 MMHG | WEIGHT: 200 LBS | RESPIRATION RATE: 18 BRPM | OXYGEN SATURATION: 100 % | HEIGHT: 70 IN | HEART RATE: 87 BPM | DIASTOLIC BLOOD PRESSURE: 84 MMHG | TEMPERATURE: 99 F | BODY MASS INDEX: 28.63 KG/M2

## 2023-07-28 VITALS
OXYGEN SATURATION: 99 % | TEMPERATURE: 98.4 F | SYSTOLIC BLOOD PRESSURE: 160 MMHG | DIASTOLIC BLOOD PRESSURE: 75 MMHG | HEART RATE: 92 BPM | HEIGHT: 71 IN | WEIGHT: 220 LBS | RESPIRATION RATE: 16 BRPM | BODY MASS INDEX: 30.8 KG/M2

## 2023-07-28 LAB
GLUCOSE BLD STRIP.AUTO-MCNC: 368 MG/DL (ref 65–100)
PERFORMED BY:: ABNORMAL

## 2023-07-28 PROCEDURE — 6370000000 HC RX 637 (ALT 250 FOR IP): Performed by: PSYCHIATRY & NEUROLOGY

## 2023-07-28 PROCEDURE — 6370000000 HC RX 637 (ALT 250 FOR IP): Performed by: PHYSICIAN ASSISTANT

## 2023-07-28 PROCEDURE — 82962 GLUCOSE BLOOD TEST: CPT

## 2023-07-28 PROCEDURE — G0378 HOSPITAL OBSERVATION PER HR: HCPCS

## 2023-07-28 RX ORDER — TRAZODONE HYDROCHLORIDE 50 MG/1
50 TABLET ORAL NIGHTLY
Qty: 30 TABLET | Refills: 0 | Status: SHIPPED | OUTPATIENT
Start: 2023-07-28

## 2023-07-28 RX ORDER — INSULIN LISPRO 100 [IU]/ML
0-4 INJECTION, SOLUTION INTRAVENOUS; SUBCUTANEOUS
Qty: 10 ML | Refills: 0 | Status: SHIPPED | OUTPATIENT
Start: 2023-07-28

## 2023-07-28 RX ORDER — HYDROCHLOROTHIAZIDE 12.5 MG/1
12.5 TABLET ORAL DAILY
Qty: 30 TABLET | Refills: 0 | Status: SHIPPED | OUTPATIENT
Start: 2023-07-29

## 2023-07-28 RX ORDER — ALOGLIPTIN 12.5 MG/1
12.5 TABLET, FILM COATED ORAL DAILY
Qty: 30 TABLET | Refills: 0 | Status: SHIPPED | OUTPATIENT
Start: 2023-07-29

## 2023-07-28 RX ORDER — AMLODIPINE BESYLATE 10 MG/1
10 TABLET ORAL DAILY
Qty: 30 TABLET | Refills: 0 | Status: SHIPPED | OUTPATIENT
Start: 2023-07-29

## 2023-07-28 RX ORDER — LURASIDONE HYDROCHLORIDE 80 MG/1
80 TABLET, FILM COATED ORAL
Qty: 30 TABLET | Refills: 0 | Status: SHIPPED | OUTPATIENT
Start: 2023-07-29

## 2023-07-28 RX ORDER — ALBUTEROL SULFATE 90 UG/1
2 AEROSOL, METERED RESPIRATORY (INHALATION) EVERY 6 HOURS PRN
Qty: 18 G | Refills: 0 | Status: SHIPPED | OUTPATIENT
Start: 2023-07-28

## 2023-07-28 RX ORDER — ATORVASTATIN CALCIUM 40 MG/1
40 TABLET, FILM COATED ORAL NIGHTLY
Qty: 30 TABLET | Refills: 0 | Status: SHIPPED | OUTPATIENT
Start: 2023-07-28

## 2023-07-28 RX ORDER — INSULIN LISPRO 100 [IU]/ML
0-4 INJECTION, SOLUTION INTRAVENOUS; SUBCUTANEOUS NIGHTLY
Qty: 10 ML | Refills: 0 | Status: SHIPPED | OUTPATIENT
Start: 2023-07-28

## 2023-07-28 RX ORDER — DIVALPROEX SODIUM 500 MG/1
1000 TABLET, DELAYED RELEASE ORAL 2 TIMES DAILY
Qty: 90 TABLET | Refills: 0 | Status: SHIPPED | OUTPATIENT
Start: 2023-07-28

## 2023-07-28 RX ADMIN — LURASIDONE HYDROCHLORIDE 80 MG: 80 TABLET, FILM COATED ORAL at 08:31

## 2023-07-28 RX ADMIN — CYANOCOBALAMIN TAB 500 MCG 500 MCG: 500 TAB at 08:33

## 2023-07-28 RX ADMIN — HYDROCHLOROTHIAZIDE 12.5 MG: 25 TABLET ORAL at 08:14

## 2023-07-28 RX ADMIN — DIVALPROEX SODIUM 1000 MG: 500 TABLET, DELAYED RELEASE ORAL at 08:14

## 2023-07-28 RX ADMIN — ALOGLIPTIN 12.5 MG: 12.5 TABLET, FILM COATED ORAL at 08:14

## 2023-07-28 RX ADMIN — CHLORDIAZEPOXIDE HYDROCHLORIDE 10 MG: 5 CAPSULE ORAL at 08:13

## 2023-07-28 RX ADMIN — Medication 100 MG: at 08:14

## 2023-07-28 RX ADMIN — AMLODIPINE BESYLATE 10 MG: 5 TABLET ORAL at 08:14

## 2023-07-28 NOTE — PROGRESS NOTES
Spiritual care assessment with Mr. Debra Bauer [facility].  was on second floor speaking with a patient when he noticed out the window that Mr. Ady Stewart was walking outside and started to become unstable in his walking and fell.  went to nearest nursing station to have Rapid Response called. Mr Ady Stewart was cared for and brought to Emergency Department (ED). A bystander found Mr. Martinez's two brown paper bags of personal belongings outside and brought them inside to reception desk where [address] [place] [address] was speaking with  about the incident.  brought personal belongs to Mr. Ady Stewart in his room in the Emergency Department. Spoke with Helene Tracey about his event outside and he shared that his wife is here somewhere. Attending RN (also present in room) called his wife.  met her in the hospital building, outside the ED. She is frustrated to the point of tears. She expressed aggravation and upset about the communication confusion and the mental condition of her . She is stressed about what to do.  brought her to her  where they held hands and talked. Mr Helene Tracey vented emotions and asked his wife to bring him to the [facility]. Mrs. Martinez expressed much appreciation for the care and help, as well as Helene Tracey expressing gratitude. Please contact Spiritual Care for any emotional and spiritual needs and/or referrals. Thank you. Chaplain Cali Covarrubias M.Div., [facility]. Please contact Hospital  for [address] services.    [phone]

## 2023-07-28 NOTE — BH NOTE
Nurse Note:    Patient observed in the dayroom interacting with security staff and peers; pleasant on approach. No report of SI, HI, A/V hallucinations. Denies depression and reports anxiety. Denies pain. Patient states, \"I feel groggy\" when this writer explained the HS medications. CIWA=-2; patient requested for the Librium dose to be changed from 25 mg to 10 mg. Dr. Mana Garcia notified and modified the dose from 25 mg TID to 10 mg QID. Patient is medication compliant.  and patient did not require HS coverage. Patient requested his watch from the safe and states, \"I see there's a patient on the unit with his watch; I'm wondering if it's ok if I get mine out of the safe\". Patient received watch and signed on the safe inventory sheet that he received the watch. Patient observed resting quietly in bed with eyes closed; will continue to monitor for safety.

## 2023-07-28 NOTE — ED NOTES
Pt seen ambulatory exiting ED with wife by triage, primary RN updated     Eddie Enciso RN  07/28/23 [phone]

## 2023-07-28 NOTE — BH NOTE
This writer facilitated family support session with patient and wife during which pt voiced concerns/complaints about not being able to sleep due to not being in his home environment, not having food of his choice and wanting to be discharged.

## 2023-07-28 NOTE — ED TRIAGE NOTES
Was just discharged from [address] [place] [address]. He was waiting for his ride and saw his wife's car outside and that caused him to become upset and he collapsed outside in the extreme heat. No current complaints.

## 2023-07-28 NOTE — BH NOTE
Behavioral Health Transition Record to Provider    Patient Name: Shravan Valle  YOB: 1960  Medical Record Number: [id number]  Date of Admission: 7/23/2023  Date of Discharge: 7/28/2023    Attending Provider: Cory Beltre MD  Discharging Provider: Dr Ro Fairly  To contact this individual call [phone] and ask the  to page. If unavailable, ask to be transferred to [facility] Provider on call. [facility] Provider will be available on call 24/7 and during holidays.     Primary Care Provider: None None    Allergies   Allergen Reactions    Metformin And Related        Reason for Admission: candace got worse    Admission Diagnosis: Major Depressive disorder    * No surgery found *    Results for orders placed or performed during the hospital encounter of 07/23/23   Valproic Acid Level, Total   Result Value Ref Range    Valproic Acid 48 (L) 50 - 100 ug/mL   Valproic Acid Level, Total   Result Value Ref Range    Valproic Acid 52 50 - 100 ug/mL   POCT Glucose   Result Value Ref Range    POC Glucose 121 (H) 65 - 100 mg/dL    Performed by: Gerardo Hendricks    POCT Glucose   Result Value Ref Range    POC Glucose 157 (H) 65 - 100 mg/dL    Performed by: Michelle Rosenberg    POCT Glucose   Result Value Ref Range    POC Glucose 188 (H) 65 - 100 mg/dL    Performed by: Glenroy Nieves    POCT Glucose   Result Value Ref Range    POC Glucose 197 (H) 65 - 100 mg/dL    Performed by: Glenroy Nieves    POCT Glucose   Result Value Ref Range    POC Glucose 109 (H) 65 - 100 mg/dL    Performed by: Glenroy Nieves    POCT Glucose   Result Value Ref Range    POC Glucose 144 (H) 65 - 100 mg/dL    Performed by: Marito Green    POCT Glucose   Result Value Ref Range    POC Glucose 157 (H) 65 - 100 mg/dL    Performed by: iLlian Amaya    POCT Glucose   Result Value Ref Range    POC Glucose 131 (H) 65 - 100 mg/dL    Performed by: Lilian Amaya    POCT Glucose   Result Value Ref Range    POC Glucose 111 discharge instructions? Not applicable       Discharge information: Patient requested to step down to accomodation of his choice.   discussed with patient/caregiver

## 2023-07-28 NOTE — BH NOTE
DISCHARGE SUMMARY    Marcial King  : 1960  MRN: [id number]    The patient Yary Garcia exhibits the ability to control behavior in a less restrictive environment. Patient's level of functioning is improving. No assaultive/destructive behavior has been observed for the past 24 hours. No suicidal/homicidal threat or behavior has been observed for the past 24 hours. There is no evidence of serious medication side effects. Patient has not been in physical or protective restraints for at least the past 24 hours. If weapons involved, how are they secured? N/a    Is patient aware of and in agreement with discharge plan? yes    Arrangements for medication:  Prescriptions see chart    Copy of discharge instructions to provider?:  yes    Arrangements for transportation home: patient stated he would like to arrange transport for self    Keep all follow up appointments as scheduled, continue to take prescribed medications per physician instructions.   Mental health crisis number:  [facility] Emergency WARM LINE      8-659-499-MHAV ([phone])      M-F: 9am to 9pm      Sat & Sun: 5pm - 9pm  National suicide prevention lines:                             6-052-BHHZYXV (6-439-801-262-290-6687)       9-325-622-TALK ([phone])    Crisis Text Line:  Text HOME to 226792
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
## 2023-07-28 NOTE — BH NOTE
1300 Patient escorted to lobby with his belongings. Patient planned to call [employer] or [employer] transportation services. He had his cell phone in his possession. He verbalized understanding of discharge instructions and signed necessary paperwork. Patient was calm, pleasant and cooperative. No physical complaints at time of discharge. Patient was educated on bus transportation services. Q15 minute checks discontinued.

## 2023-07-28 NOTE — BH NOTE
Client given discharge information along with personal belongings,prescriptions and valuables. Client verbalized understanding discharge plans. Escorted to lobby by staff.
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
## 2023-07-31 LAB
EKG ATRIAL RATE: 93 BPM
EKG DIAGNOSIS: NORMAL
EKG P AXIS: 55 DEGREES
EKG P-R INTERVAL: 138 MS
EKG Q-T INTERVAL: 396 MS
EKG QRS DURATION: 90 MS
EKG QTC CALCULATION (BAZETT): 492 MS
EKG R AXIS: 40 DEGREES
EKG T AXIS: 64 DEGREES
EKG VENTRICULAR RATE: 93 BPM